# Patient Record
Sex: FEMALE | Race: ASIAN | Employment: OTHER | URBAN - METROPOLITAN AREA
[De-identification: names, ages, dates, MRNs, and addresses within clinical notes are randomized per-mention and may not be internally consistent; named-entity substitution may affect disease eponyms.]

---

## 2020-08-13 ENCOUNTER — MEDICAL CORRESPONDENCE (OUTPATIENT)
Dept: HEALTH INFORMATION MANAGEMENT | Facility: CLINIC | Age: 35
End: 2020-08-13

## 2020-08-14 ENCOUNTER — TELEPHONE (OUTPATIENT)
Dept: ADMINISTRATIVE | Facility: CLINIC | Age: 35
End: 2020-08-14

## 2020-08-14 NOTE — TELEPHONE ENCOUNTER
Diabetes Education Scheduling Outreach #1:    Call to patient to schedule. Patient wants to check with insurance on visit coverage first, and will call us back to schedule.    Plan for 2nd outreach attempt within 1 business day.    [facility]  Diabetes and Nutrition Scheduling

## 2020-08-17 NOTE — TELEPHONE ENCOUNTER
Diabetes Education Scheduling Outreach #2:    Call to patient to schedule. Left message with phone number to call to schedule.    Cammy Woods  [place] [facility]  Diabetes and Nutrition Scheduling

## 2020-08-18 NOTE — TELEPHONE ENCOUNTER
Diabetes Education Scheduling Outreach #3:    Call to patient to schedule. Left message with phone number to call to schedule.    Cammy Woods  [place] [facility]  Diabetes and Nutrition Scheduling

## 2020-08-25 ENCOUNTER — PATIENT OUTREACH (OUTPATIENT)
Dept: EDUCATION SERVICES | Facility: CLINIC | Age: 35
End: 2020-08-25
Payer: COMMERCIAL

## 2020-08-25 DIAGNOSIS — O24.419 GESTATIONAL DIABETES: Primary | ICD-10-CM

## 2020-08-25 PROCEDURE — G0108 DIAB MANAGE TRN  PER INDIV: HCPCS | Mod: 95

## 2020-08-25 RX ORDER — BLOOD-GLUCOSE METER
1 EACH MISCELLANEOUS DAILY
Qty: 1 KIT | Refills: 1 | COMMUNITY
Start: 2020-08-25

## 2020-08-25 NOTE — PROGRESS NOTES
Diabetes Self-Management Education & Support    SUBJECTIVE/OBJECTIVE:  Presents for education related to gestational diabetes.  Patient verbally consented to the telephone visit service today: yes      Accompanied by: Self  Gestational weeks:   14.5 weeks - feb 8 2021  Number of previous preganancies: 0    Cultural Influences/Ethnic Background:  [place]    Estimated Date of Delivery: Data Unavailable    1 hour OGTT  No results found for: GLU1    3 hour OGTT    Fasting  No results found for: GLF    1 hour  No results found for: GL1    2 hour  No results found for: GL2    3 hour  No results found for: GL3    Lifestyle and Health Behaviors:  Exercise:: Yes  Meal planning/habits: Avoiding sweets  Meals include: Breakfast, Lunch, Dinner  Biggest challenges to healthy eating: None    Nutrition: vegetarian   Breakfast - 1 cup of milk of cold cereal + banana or fruit if still hungry   Lunch - lentils / peas / whole wheat flat bread / rice and some vegetables    Dinner -  lentils / peas / whole wheat flat bread / rice and some vegetables    Snacks - fruit / yogurt / nuts   Milk before bed   Protein -  Lentils / beans / nuts / milk   Eggs are only occasional 1-2 months   Water, coconut water.  Milk, yogurt, cheese, sometimes tofu not often.   Avocado allergy - stomach pain     Healthy Coping:  Emotional response to diabetes: Ready to learn  Stage of change: ACTION (Actively working towards change)    Current Management:  Taking medications for gestational diabetes?: No    ASSESSMENT:  Blood sugar elevated at random check and 1 hour OGTT.  Reviewed glucometer and carbohydrate counting and topics listed below.  Patient to call [phone] if having issues with the glucometer or needing another brand.  Follow up via phone in one week.  Briefly reviewed how insulin is used as a medication if needing this and in that case [facility].      INTERVENTION:  Patient was  instructed on Contour Next One meter    Educational topics covered today:  GDM / high blood sugar diagnosis, pathophysiology, Risks and Complications of GDM, Means of controlling GDM, Using a Blood Glucose Monitor, Blood Glucose Goals, Logging and Interpreting Glucose Results,   When to Call a Diabetes Educator or OB Provider, Healthy Eating During Pregnancy, Counting Carbohydrates, Meal Planning for GDM, and Physical Activity    Educational materials provided today:   [facility] Understanding Gestational Diabetes  GDM Log Book  Care After Delivery    Pt verbalized understanding of concepts discussed and recommendations provided today.     PLAN:  Ketone Testing at follow up visit   Check glucose 4 times daily, before breakfast and 1 hour after each meal.   Physical activity recommended: as tolerated .  Meal plan: 2-3 carbs at breakfast, 3-4 carbs at lunch, 3-4 carbs at supper, 1-2 carbs at 3 snacks a day.  Follow consistent CHO meal plan, eat CHO and protein/fat at all meals/snacks.    Call/e-mail/Next Safetyhart message diabetes educator if 3 or more blood sugars are above the goal in 1 week, if ketones are positive, or with questions/concerns.    Giselle Lyn RD, LD, CDE  Diabetes Education    Time Spent: 56 minutes  Encounter Type: Individual    A copy of this encounter was shared with the provider via fax.

## 2020-08-25 NOTE — LETTER
8/25/2020         RE: Adri Jaeger  [id number] [facility] [address]        Dear Colleague,  (FYI only)     Thank you for referring your patient, Adri Jaeger, to the [facility]. Please see a copy of my visit note below.    She will be picking up the meter this week and will begin QID blood sugar checks.  Will begin carbohydrate counting and logging foods.   Follow up in one week and we can update on blood sugars at that time.     Thanks!  Jeannine Lyn RD, LD, CDE  Diabetes Education      Diabetes Self-Management Education & Support    SUBJECTIVE/OBJECTIVE:  Presents for education related to gestational diabetes.  Patient verbally consented to the telephone visit service today: yes      Accompanied by: Self  Gestational weeks:   14.5 weeks - feb 8 2021  Number of previous preganancies: 0    Cultural Influences/Ethnic Background:      Estimated Date of Delivery: Data Unavailable    1 hour OGTT  No results found for: GLU1    3 hour OGTT    Fasting  No results found for: GLF    1 hour  No results found for: GL1    2 hour  No results found for: GL2    3 hour  No results found for: GL3    Lifestyle and Health Behaviors:  Exercise:: Yes  Meal planning/habits: Avoiding sweets  Meals include: Breakfast, Lunch, Dinner  Biggest challenges to healthy eating: None    Nutrition: vegetarian   Breakfast - 1 cup of milk of cold cereal + banana or fruit if still hungry   Lunch - lentils / peas / whole wheat flat bread / rice and some vegetables    Dinner -  lentils / peas / whole wheat flat bread / rice and some vegetables    Snacks - fruit / yogurt / nuts   Milk before bed   Protein -  Lentils / beans / nuts / milk   Eggs are only occasional 1-2 months   Water, coconut water.  Milk, yogurt, cheese, sometimes tofu not often.   Avocado allergy - stomach pain     Healthy Coping:  Emotional response to diabetes: Ready to learn  Stage of change: ACTION (Actively working  towards change)    Current Management:  Taking medications for gestational diabetes?: No    ASSESSMENT:  Blood sugar elevated at random check and 1 hour OGTT.  Reviewed glucometer and carbohydrate counting and topics listed below.  Patient to call [phone] if having issues with the glucometer or needing another brand.  Follow up via phone in one week.  Briefly reviewed how insulin is used as a medication if needing this and in that case patient would be referred to [facility] endocrinology for management.      INTERVENTION:  Patient was instructed on Contour Next One meter    Educational topics covered today:  GDM / high blood sugar diagnosis, pathophysiology, Risks and Complications of GDM, Means of controlling GDM, Using a Blood Glucose Monitor, Blood Glucose Goals, Logging and Interpreting Glucose Results,   When to Call a Diabetes Educator or OB Provider, Healthy Eating During Pregnancy, Counting Carbohydrates, Meal Planning for GDM, and Physical Activity    Educational materials provided today:   [facility] [place] Gestational Diabetes  GDM Log Book  Care After Delivery    Pt verbalized understanding of concepts discussed and recommendations provided today.     PLAN:  Ketone Testing at follow up visit   Check glucose 4 times daily, before breakfast and 1 hour after each meal.   Physical activity recommended: as tolerated .  Meal plan: 2-3 carbs at breakfast, 3-4 carbs at lunch, 3-4 carbs at supper, 1-2 carbs at 3 snacks a day.  Follow consistent CHO meal plan, eat CHO and protein/fat at all meals/snacks.    Call/e-mail/Vyyohart message diabetes educator if 3 or more blood sugars are above the goal in 1 week, if ketones are positive, or with questions/concerns.    Giselle Lyn RD, LD, CDE  Diabetes Education    Time Spent: 56 minutes  Encounter Type: Individual    A copy of this encounter was shared with the provider.

## 2020-09-01 ENCOUNTER — VIRTUAL VISIT (OUTPATIENT)
Dept: EDUCATION SERVICES | Facility: CLINIC | Age: 35
End: 2020-09-01
Payer: COMMERCIAL

## 2020-09-01 DIAGNOSIS — O24.419 GESTATIONAL DIABETES: Primary | ICD-10-CM

## 2020-09-01 PROCEDURE — 98968 PH1 ASSMT&MGMT NQHP 21-30: CPT | Mod: 95

## 2020-09-01 NOTE — LETTER
URGENT    9/1/2020         RE: Adri Jaeger  [address] [facility] [address]        Dr. Viera,  100% of patient's fasting, post-lunch and post-dinner readings are above target.  Would recommend insulin start. As indicated in referral, if patient needs insulin she will be referred to Endocrinology.  Will plan to hand over GDM care to endo, provided patient with our phone number if she has any concerns prior to her Endo appointment.   Thanks,  Nena Ch, RD, LD, CDE      Diabetes Self-Management Education & Support    SUBJECTIVE/OBJECTIVE:  Telephone appointment for education related to gestational diabetes.    Patient verbally consented to the telephone visit service today: yes    Cultural Influences/Ethnic Background:  [place]    There were no vitals taken for this visit.    Weight gain Unknown    Estimated Date of Delivery: 2/8/2021  Blood Glucose/Ketone Log:   Blood Glucose/Ketone Log:    Date Ketones Fasting Post Breakfast Post Lunch Post Supper   9/1  97 100     8/31  123 125 146 147   8/30  101 105 182 145   8/29  114 115 177 169   8/28  108 138 149 148   8/27  124 91 203 202     Breakfast: almonds and walnuts and 1 glass of milk  Walk 15 minutes  Snack: If she feels hungry  Lunch: Rice with vegetables and lentils OR whole wheat flat bread and lentils and vegetables  Walk 15 minutes  Snack: If she feels hungry  Dinner: whole wheat flat bread with vegetables and lentils  Walk 15 minutes  Pm snack: milk      Lifestyle and Health Behaviors:       Healthy Coping:       Current Management:       ASSESSMENT:  Ketones: not testing.   Fasting blood glucoses: 0% in target.  After breakfast: 100% in target.  After lunch: 0% in target.  After dinner: 0% in target.    Only Adri's after breakfast readings are in target, all other numbers elevated. She has been experimenting with different foods and different amounts of foods at her meal to help keep her blood sugars down. She states that the  carbohydrate counting is going well, no questions or concerns.  She has found that when she doesn't have a bedtime snack (last night) her fasting BG was closer to normal.    Writer discussed recommendation to start insulin, patient is very hesitant and is wondering if she can work on her diet for one more week.  Writer explained that [facility] will refer her to Endocrinology which can take a couple of days, recommended that she schedule the Endo appt and work on diet changes until the appointment. Explained that the insulin needs increase as the pregnancy progresses and it will get harder and harder to bring BG down with diet alone. Also explained that we would like to get the blood sugars down quickly to help protect the baby. Patient is reluctant, but agreeable to plan.     INTERVENTION:  Educational topics covered today:  Discussed meal plan, insulin start recommendations, discussed exercise.    Educational Materials provided today:  No new materials    PLAN:  Recommend insulin start- sending to [facility] for endocrinology referral. Endo will follow for remainder of pregnancy.  Encouraged patient to call with any questions/concerns until endocrinology appointment.     Check glucose 4 times daily.  Check ketones once a week when readings are consistently negative.  Continue with recommended physical activity.  Continue to follow recommended meal plan: 2-3 carbs at breakfast, 3-4 carbs at lunch, 3-4 carbs at supper, 1-2 carbs at snacks.  Follow consistent CHO meal plan, eat CHO and protein/fat at all meals/snacks.    Call/e-mail/MyChart message diabetes educator if 3 or more blood sugars are above the goal in 1 week or if ketones are positive.    Nena Ch, APRYL, LD, CDE  Time Spent: 26 minutes  Encounter Type: Individual    Any diabetes medication dose changes were made via the CDE Protocol and Collaborative Practice Agreement with the patient's referring provider. A copy of this encounter was  shared with the provider.

## 2020-09-11 ENCOUNTER — TELEPHONE (OUTPATIENT)
Dept: EDUCATION SERVICES | Facility: CLINIC | Age: 35
End: 2020-09-11

## 2020-09-11 NOTE — TELEPHONE ENCOUNTER
Called to call back [phone]    Beckie Alvarado RN/ALMA [facility] Diabetes Educator    Called back:    Gestational Diabetes Follow-up    Subjective/Objective:    Adri Jaeger sent in blood glucose log for review. Last date of communication was: 9/1/2020.    Gestational diabetes is being managed with diet and activity    Taking diabetes medications: no    Estimated Date of Delivery: Data Unavailable    BG/Food Log:   Blood Glucose/Ketone Log:    Date Ketones Fasting Post Breakfast Post Lunch Post Supper   9/1  97 100 162 161     9/2  96 111 157 150   9/3  98 181 139 181   9/4  105 111 183 160   9/5  93  101 201   9/6  92 123 159 102   9/7  9/8  9/9  9/10  9/11  94  90  92  98  105 102  140  130  96  160 106  151  208  87 161  171  168  154         Assessment:80    Ketones: not checking.   Fasting blood glucoses: 54% in target.  After breakfast: 80% in target.  After lunch: 44% in target.  After dinner: 11% in target.    Plan/Response:    RECOMMEND INSULIN START BEDTIME AND PRE MEALS.  PER [facility] for Endo to follow per their protocol on their referrals. Dated 8/14/2020    Recommend referral to Endocrinology. Per the [facility] protocol.    Called [facility]  And spoke to triage, I believe Catie, and we did discuss the issue at hand.  She is sending a message to the on call OBGYN to get this patient started on an Endo referral and begin insulin.  I did let patient know that she will be following Endo from this point on since she will need insulin therapy.    Copy this note and fax to [phone]    Beckie Alvarado RN/ALMA [facility] Diabetes Educator      Any diabetes medication dose changes were made via the CDE Protocol and Collaborative Practice Agreement with the patient's primary care provider, endocrinology provider and OB/GYN provider. A copy of this encounter was shared with the provider.

## 2021-01-29 ENCOUNTER — ANESTHESIA EVENT (OUTPATIENT)
Dept: SURGERY | Facility: CLINIC | Age: 36
End: 2021-01-29
Payer: COMMERCIAL

## 2021-01-29 ENCOUNTER — ANESTHESIA (OUTPATIENT)
Dept: SURGERY | Facility: CLINIC | Age: 36
End: 2021-01-29
Payer: COMMERCIAL

## 2021-01-29 ENCOUNTER — HOSPITAL ENCOUNTER (INPATIENT)
Facility: CLINIC | Age: 36
LOS: 3 days | Discharge: HOME | End: 2021-02-01
Attending: OBSTETRICS & GYNECOLOGY | Admitting: OBSTETRICS & GYNECOLOGY
Payer: COMMERCIAL

## 2021-01-29 DIAGNOSIS — Z98.891 S/P CESAREAN SECTION: Primary | ICD-10-CM

## 2021-01-29 PROBLEM — O47.9 UTERINE CONTRACTIONS DURING PREGNANCY: Status: ACTIVE | Noted: 2021-01-29

## 2021-01-29 LAB
ABO + RH BLD: NORMAL
ABO + RH BLD: NORMAL
BASOPHILS # BLD AUTO: 0 10E9/L (ref 0–0.2)
BASOPHILS NFR BLD AUTO: 0.1 %
DIFFERENTIAL METHOD BLD: ABNORMAL
EOSINOPHIL # BLD AUTO: 0 10E9/L (ref 0–0.7)
EOSINOPHIL NFR BLD AUTO: 0.1 %
ERYTHROCYTE [DISTWIDTH] IN BLOOD BY AUTOMATED COUNT: 13.6 % (ref 10–15)
GLUCOSE BLDC GLUCOMTR-MCNC: 106 MG/DL (ref 70–99)
GLUCOSE BLDC GLUCOMTR-MCNC: 121 MG/DL (ref 70–99)
GLUCOSE BLDC GLUCOMTR-MCNC: 138 MG/DL (ref 70–99)
GLUCOSE BLDC GLUCOMTR-MCNC: 96 MG/DL (ref 70–99)
HCT VFR BLD AUTO: 36.2 % (ref 35–47)
HGB BLD-MCNC: 12 G/DL (ref 11.7–15.7)
IMM GRANULOCYTES # BLD: 0.1 10E9/L (ref 0–0.4)
IMM GRANULOCYTES NFR BLD: 0.6 %
LABORATORY COMMENT REPORT: NORMAL
LYMPHOCYTES # BLD AUTO: 1.1 10E9/L (ref 0.8–5.3)
LYMPHOCYTES NFR BLD AUTO: 7.8 %
MCH RBC QN AUTO: 28.6 PG (ref 26.5–33)
MCHC RBC AUTO-ENTMCNC: 33.1 G/DL (ref 31.5–36.5)
MCV RBC AUTO: 86 FL (ref 78–100)
MONOCYTES # BLD AUTO: 0.3 10E9/L (ref 0–1.3)
MONOCYTES NFR BLD AUTO: 2 %
NEUTROPHILS # BLD AUTO: 12.8 10E9/L (ref 1.6–8.3)
NEUTROPHILS NFR BLD AUTO: 89.4 %
NRBC # BLD AUTO: 0 10*3/UL
NRBC BLD AUTO-RTO: 0 /100
PLATELET # BLD AUTO: 203 10E9/L (ref 150–450)
RBC # BLD AUTO: 4.19 10E12/L (ref 3.8–5.2)
SARS-COV-2 RNA RESP QL NAA+PROBE: NEGATIVE
SPECIMEN EXP DATE BLD: NORMAL
SPECIMEN SOURCE: NORMAL
WBC # BLD AUTO: 14.3 10E9/L (ref 4–11)

## 2021-01-29 PROCEDURE — 272N000001 HC OR GENERAL SUPPLY STERILE: Performed by: OBSTETRICS & GYNECOLOGY

## 2021-01-29 PROCEDURE — 258N000003 HC RX IP 258 OP 636: Performed by: OBSTETRICS & GYNECOLOGY

## 2021-01-29 PROCEDURE — 250N000011 HC RX IP 250 OP 636

## 2021-01-29 PROCEDURE — 370N000017 HC ANESTHESIA TECHNICAL FEE, PER MIN: Performed by: OBSTETRICS & GYNECOLOGY

## 2021-01-29 PROCEDURE — 86901 BLOOD TYPING SEROLOGIC RH(D): CPT | Performed by: OBSTETRICS & GYNECOLOGY

## 2021-01-29 PROCEDURE — 710N000009 HC RECOVERY PHASE 1, LEVEL 1, PER MIN: Performed by: OBSTETRICS & GYNECOLOGY

## 2021-01-29 PROCEDURE — 360N000076 HC SURGERY LEVEL 3, PER MIN: Performed by: OBSTETRICS & GYNECOLOGY

## 2021-01-29 PROCEDURE — 258N000003 HC RX IP 258 OP 636

## 2021-01-29 PROCEDURE — 250N000009 HC RX 250: Performed by: ANESTHESIOLOGY

## 2021-01-29 PROCEDURE — 250N000013 HC RX MED GY IP 250 OP 250 PS 637: Performed by: OBSTETRICS & GYNECOLOGY

## 2021-01-29 PROCEDURE — 3E0R3BZ INTRODUCTION OF ANESTHETIC AGENT INTO SPINAL CANAL, PERCUTANEOUS APPROACH: ICD-10-PCS | Performed by: ANESTHESIOLOGY

## 2021-01-29 PROCEDURE — 120N000001 HC R&B MED SURG/OB

## 2021-01-29 PROCEDURE — G0463 HOSPITAL OUTPT CLINIC VISIT: HCPCS | Mod: 25

## 2021-01-29 PROCEDURE — 250N000011 HC RX IP 250 OP 636: Performed by: OBSTETRICS & GYNECOLOGY

## 2021-01-29 PROCEDURE — 85025 COMPLETE CBC W/AUTO DIFF WBC: CPT | Performed by: OBSTETRICS & GYNECOLOGY

## 2021-01-29 PROCEDURE — 86900 BLOOD TYPING SEROLOGIC ABO: CPT | Performed by: OBSTETRICS & GYNECOLOGY

## 2021-01-29 PROCEDURE — 250N000011 HC RX IP 250 OP 636: Performed by: NURSE ANESTHETIST, CERTIFIED REGISTERED

## 2021-01-29 PROCEDURE — 87635 SARS-COV-2 COVID-19 AMP PRB: CPT | Performed by: OBSTETRICS & GYNECOLOGY

## 2021-01-29 PROCEDURE — 86780 TREPONEMA PALLIDUM: CPT | Performed by: OBSTETRICS & GYNECOLOGY

## 2021-01-29 PROCEDURE — 999N001017 HC STATISTIC GLUCOSE BY METER IP

## 2021-01-29 PROCEDURE — 00HU33Z INSERTION OF INFUSION DEVICE INTO SPINAL CANAL, PERCUTANEOUS APPROACH: ICD-10-PCS | Performed by: ANESTHESIOLOGY

## 2021-01-29 PROCEDURE — 250N000009 HC RX 250: Performed by: NURSE ANESTHETIST, CERTIFIED REGISTERED

## 2021-01-29 PROCEDURE — 250N000009 HC RX 250

## 2021-01-29 PROCEDURE — 258N000003 HC RX IP 258 OP 636: Performed by: NURSE ANESTHETIST, CERTIFIED REGISTERED

## 2021-01-29 RX ORDER — OXYTOCIN/0.9 % SODIUM CHLORIDE 30/500 ML
100 PLASTIC BAG, INJECTION (ML) INTRAVENOUS CONTINUOUS
Status: DISCONTINUED | OUTPATIENT
Start: 2021-01-29 | End: 2021-02-01 | Stop reason: HOSPADM

## 2021-01-29 RX ORDER — TRANEXAMIC ACID 10 MG/ML
1 INJECTION, SOLUTION INTRAVENOUS EVERY 30 MIN PRN
Status: DISCONTINUED | OUTPATIENT
Start: 2021-01-29 | End: 2021-01-29

## 2021-01-29 RX ORDER — AMOXICILLIN 250 MG
2 CAPSULE ORAL 2 TIMES DAILY
Status: DISCONTINUED | OUTPATIENT
Start: 2021-01-29 | End: 2021-02-01 | Stop reason: HOSPADM

## 2021-01-29 RX ORDER — CARBOPROST TROMETHAMINE 250 UG/ML
250 INJECTION, SOLUTION INTRAMUSCULAR
Status: DISCONTINUED | OUTPATIENT
Start: 2021-01-29 | End: 2021-01-29

## 2021-01-29 RX ORDER — OXYTOCIN/0.9 % SODIUM CHLORIDE 30/500 ML
PLASTIC BAG, INJECTION (ML) INTRAVENOUS
Status: DISCONTINUED
Start: 2021-01-29 | End: 2021-01-29 | Stop reason: HOSPADM

## 2021-01-29 RX ORDER — SODIUM CHLORIDE, SODIUM LACTATE, POTASSIUM CHLORIDE, CALCIUM CHLORIDE 600; 310; 30; 20 MG/100ML; MG/100ML; MG/100ML; MG/100ML
INJECTION, SOLUTION INTRAVENOUS CONTINUOUS PRN
Status: DISCONTINUED | OUTPATIENT
Start: 2021-01-29 | End: 2021-01-29

## 2021-01-29 RX ORDER — OXYTOCIN/0.9 % SODIUM CHLORIDE 30/500 ML
PLASTIC BAG, INJECTION (ML) INTRAVENOUS PRN
Status: DISCONTINUED | OUTPATIENT
Start: 2021-01-29 | End: 2021-01-29

## 2021-01-29 RX ORDER — NICOTINE POLACRILEX 4 MG
15-30 LOZENGE BUCCAL
Status: DISCONTINUED | OUTPATIENT
Start: 2021-01-29 | End: 2021-01-29

## 2021-01-29 RX ORDER — ONDANSETRON 2 MG/ML
INJECTION INTRAMUSCULAR; INTRAVENOUS PRN
Status: DISCONTINUED | OUTPATIENT
Start: 2021-01-29 | End: 2021-01-29

## 2021-01-29 RX ORDER — ONDANSETRON 2 MG/ML
4 INJECTION INTRAMUSCULAR; INTRAVENOUS EVERY 6 HOURS PRN
Status: DISCONTINUED | OUTPATIENT
Start: 2021-01-29 | End: 2021-02-01 | Stop reason: HOSPADM

## 2021-01-29 RX ORDER — FENTANYL/BUPIVACAINE/NS/PF 2-1250MCG
PLASTIC BAG, INJECTION (ML) INJECTION
Status: COMPLETED
Start: 2021-01-29 | End: 2021-01-29

## 2021-01-29 RX ORDER — ACETAMINOPHEN 325 MG/1
650 TABLET ORAL EVERY 4 HOURS PRN
Status: DISCONTINUED | OUTPATIENT
Start: 2021-01-29 | End: 2021-01-29

## 2021-01-29 RX ORDER — CEFAZOLIN SODIUM 1 G/3ML
1 INJECTION, POWDER, FOR SOLUTION INTRAMUSCULAR; INTRAVENOUS SEE ADMIN INSTRUCTIONS
Status: DISCONTINUED | OUTPATIENT
Start: 2021-01-29 | End: 2021-01-29 | Stop reason: HOSPADM

## 2021-01-29 RX ORDER — IBUPROFEN 800 MG/1
800 TABLET, FILM COATED ORAL EVERY 6 HOURS
Status: DISCONTINUED | OUTPATIENT
Start: 2021-01-30 | End: 2021-02-01 | Stop reason: HOSPADM

## 2021-01-29 RX ORDER — OXYTOCIN 10 [USP'U]/ML
10 INJECTION, SOLUTION INTRAMUSCULAR; INTRAVENOUS
Status: DISCONTINUED | OUTPATIENT
Start: 2021-01-29 | End: 2021-02-01 | Stop reason: HOSPADM

## 2021-01-29 RX ORDER — EPHEDRINE SULFATE 50 MG/ML
INJECTION, SOLUTION INTRAVENOUS PRN
Status: DISCONTINUED | OUTPATIENT
Start: 2021-01-29 | End: 2021-01-29

## 2021-01-29 RX ORDER — OXYCODONE AND ACETAMINOPHEN 5; 325 MG/1; MG/1
1 TABLET ORAL
Status: DISCONTINUED | OUTPATIENT
Start: 2021-01-29 | End: 2021-01-29

## 2021-01-29 RX ORDER — MODIFIED LANOLIN
OINTMENT (GRAM) TOPICAL
Status: DISCONTINUED | OUTPATIENT
Start: 2021-01-29 | End: 2021-02-01 | Stop reason: HOSPADM

## 2021-01-29 RX ORDER — NALOXONE HYDROCHLORIDE 0.4 MG/ML
0.4 INJECTION, SOLUTION INTRAMUSCULAR; INTRAVENOUS; SUBCUTANEOUS
Status: DISCONTINUED | OUTPATIENT
Start: 2021-01-29 | End: 2021-02-01 | Stop reason: HOSPADM

## 2021-01-29 RX ORDER — OXYTOCIN/0.9 % SODIUM CHLORIDE 30/500 ML
100-340 PLASTIC BAG, INJECTION (ML) INTRAVENOUS CONTINUOUS PRN
Status: DISCONTINUED | OUTPATIENT
Start: 2021-01-29 | End: 2021-01-29

## 2021-01-29 RX ORDER — KETOROLAC TROMETHAMINE 30 MG/ML
30 INJECTION, SOLUTION INTRAMUSCULAR; INTRAVENOUS EVERY 6 HOURS
Status: COMPLETED | OUTPATIENT
Start: 2021-01-29 | End: 2021-01-30

## 2021-01-29 RX ORDER — HYDROCORTISONE 2.5 %
CREAM (GRAM) TOPICAL 3 TIMES DAILY PRN
Status: DISCONTINUED | OUTPATIENT
Start: 2021-01-29 | End: 2021-02-01 | Stop reason: HOSPADM

## 2021-01-29 RX ORDER — NALOXONE HYDROCHLORIDE 0.4 MG/ML
0.2 INJECTION, SOLUTION INTRAMUSCULAR; INTRAVENOUS; SUBCUTANEOUS
Status: DISCONTINUED | OUTPATIENT
Start: 2021-01-29 | End: 2021-01-29

## 2021-01-29 RX ORDER — BISACODYL 10 MG
10 SUPPOSITORY, RECTAL RECTAL DAILY PRN
Status: DISCONTINUED | OUTPATIENT
Start: 2021-01-31 | End: 2021-02-01 | Stop reason: HOSPADM

## 2021-01-29 RX ORDER — NALOXONE HYDROCHLORIDE 0.4 MG/ML
0.2 INJECTION, SOLUTION INTRAMUSCULAR; INTRAVENOUS; SUBCUTANEOUS
Status: DISCONTINUED | OUTPATIENT
Start: 2021-01-29 | End: 2021-02-01 | Stop reason: HOSPADM

## 2021-01-29 RX ORDER — OXYTOCIN/0.9 % SODIUM CHLORIDE 30/500 ML
340 PLASTIC BAG, INJECTION (ML) INTRAVENOUS CONTINUOUS PRN
Status: DISCONTINUED | OUTPATIENT
Start: 2021-01-29 | End: 2021-02-01 | Stop reason: HOSPADM

## 2021-01-29 RX ORDER — SIMETHICONE 80 MG
80 TABLET,CHEWABLE ORAL 4 TIMES DAILY PRN
Status: DISCONTINUED | OUTPATIENT
Start: 2021-01-29 | End: 2021-02-01 | Stop reason: HOSPADM

## 2021-01-29 RX ORDER — CEFAZOLIN SODIUM 2 G/100ML
2 INJECTION, SOLUTION INTRAVENOUS
Status: DISCONTINUED | OUTPATIENT
Start: 2021-01-29 | End: 2021-01-29 | Stop reason: HOSPADM

## 2021-01-29 RX ORDER — SODIUM CHLORIDE, SODIUM LACTATE, POTASSIUM CHLORIDE, CALCIUM CHLORIDE 600; 310; 30; 20 MG/100ML; MG/100ML; MG/100ML; MG/100ML
INJECTION, SOLUTION INTRAVENOUS CONTINUOUS
Status: DISCONTINUED | OUTPATIENT
Start: 2021-01-29 | End: 2021-01-29 | Stop reason: HOSPADM

## 2021-01-29 RX ORDER — ACETAMINOPHEN 325 MG/1
975 TABLET ORAL EVERY 6 HOURS
Status: DISCONTINUED | OUTPATIENT
Start: 2021-01-29 | End: 2021-02-01 | Stop reason: HOSPADM

## 2021-01-29 RX ORDER — DEXTROSE MONOHYDRATE 25 G/50ML
25-50 INJECTION, SOLUTION INTRAVENOUS
Status: DISCONTINUED | OUTPATIENT
Start: 2021-01-29 | End: 2021-02-01 | Stop reason: HOSPADM

## 2021-01-29 RX ORDER — NALOXONE HYDROCHLORIDE 0.4 MG/ML
0.4 INJECTION, SOLUTION INTRAMUSCULAR; INTRAVENOUS; SUBCUTANEOUS
Status: DISCONTINUED | OUTPATIENT
Start: 2021-01-29 | End: 2021-01-29

## 2021-01-29 RX ORDER — DEXTROSE, SODIUM CHLORIDE, SODIUM LACTATE, POTASSIUM CHLORIDE, AND CALCIUM CHLORIDE 5; .6; .31; .03; .02 G/100ML; G/100ML; G/100ML; G/100ML; G/100ML
INJECTION, SOLUTION INTRAVENOUS CONTINUOUS
Status: DISCONTINUED | OUTPATIENT
Start: 2021-01-29 | End: 2021-02-01 | Stop reason: HOSPADM

## 2021-01-29 RX ORDER — OXYCODONE HYDROCHLORIDE 5 MG/1
5 TABLET ORAL EVERY 4 HOURS PRN
Status: DISCONTINUED | OUTPATIENT
Start: 2021-01-29 | End: 2021-02-01 | Stop reason: HOSPADM

## 2021-01-29 RX ORDER — AMOXICILLIN 250 MG
1 CAPSULE ORAL 2 TIMES DAILY
Status: DISCONTINUED | OUTPATIENT
Start: 2021-01-29 | End: 2021-02-01 | Stop reason: HOSPADM

## 2021-01-29 RX ORDER — OXYTOCIN 10 [USP'U]/ML
10 INJECTION, SOLUTION INTRAMUSCULAR; INTRAVENOUS
Status: DISCONTINUED | OUTPATIENT
Start: 2021-01-29 | End: 2021-01-29

## 2021-01-29 RX ORDER — DEXTROSE MONOHYDRATE 25 G/50ML
25-50 INJECTION, SOLUTION INTRAVENOUS
Status: DISCONTINUED | OUTPATIENT
Start: 2021-01-29 | End: 2021-01-29

## 2021-01-29 RX ORDER — TRANEXAMIC ACID 10 MG/ML
1 INJECTION, SOLUTION INTRAVENOUS EVERY 30 MIN PRN
Status: DISCONTINUED | OUTPATIENT
Start: 2021-01-29 | End: 2021-02-01 | Stop reason: HOSPADM

## 2021-01-29 RX ORDER — AZITHROMYCIN 500 MG/5ML
500 INJECTION, POWDER, LYOPHILIZED, FOR SOLUTION INTRAVENOUS
Status: DISCONTINUED | OUTPATIENT
Start: 2021-01-29 | End: 2021-01-29 | Stop reason: HOSPADM

## 2021-01-29 RX ORDER — CITRIC ACID/SODIUM CITRATE 334-500MG
30 SOLUTION, ORAL ORAL
Status: COMPLETED | OUTPATIENT
Start: 2021-01-29 | End: 2021-01-29

## 2021-01-29 RX ORDER — MORPHINE SULFATE 1 MG/ML
INJECTION, SOLUTION EPIDURAL; INTRATHECAL; INTRAVENOUS PRN
Status: DISCONTINUED | OUTPATIENT
Start: 2021-01-29 | End: 2021-01-29

## 2021-01-29 RX ORDER — SODIUM CHLORIDE 9 MG/ML
INJECTION, SOLUTION INTRAVENOUS CONTINUOUS
Status: DISCONTINUED | OUTPATIENT
Start: 2021-01-29 | End: 2021-01-29

## 2021-01-29 RX ORDER — ONDANSETRON 2 MG/ML
4 INJECTION INTRAMUSCULAR; INTRAVENOUS EVERY 6 HOURS PRN
Status: DISCONTINUED | OUTPATIENT
Start: 2021-01-29 | End: 2021-01-29

## 2021-01-29 RX ORDER — METHYLERGONOVINE MALEATE 0.2 MG/ML
200 INJECTION INTRAVENOUS
Status: DISCONTINUED | OUTPATIENT
Start: 2021-01-29 | End: 2021-01-29

## 2021-01-29 RX ORDER — EPHEDRINE SULFATE 50 MG/ML
INJECTION, SOLUTION INTRAMUSCULAR; INTRAVENOUS; SUBCUTANEOUS
Status: COMPLETED
Start: 2021-01-29 | End: 2021-01-29

## 2021-01-29 RX ORDER — FENTANYL CITRATE 50 UG/ML
50-100 INJECTION, SOLUTION INTRAMUSCULAR; INTRAVENOUS
Status: DISCONTINUED | OUTPATIENT
Start: 2021-01-29 | End: 2021-01-29

## 2021-01-29 RX ORDER — HYDROMORPHONE HYDROCHLORIDE 1 MG/ML
0.2 INJECTION, SOLUTION INTRAMUSCULAR; INTRAVENOUS; SUBCUTANEOUS
Status: DISCONTINUED | OUTPATIENT
Start: 2021-01-29 | End: 2021-02-01 | Stop reason: HOSPADM

## 2021-01-29 RX ORDER — PHENYLEPHRINE HYDROCHLORIDE 10 MG/ML
INJECTION INTRAVENOUS PRN
Status: DISCONTINUED | OUTPATIENT
Start: 2021-01-29 | End: 2021-01-29

## 2021-01-29 RX ORDER — LIDOCAINE 40 MG/G
CREAM TOPICAL
Status: DISCONTINUED | OUTPATIENT
Start: 2021-01-29 | End: 2021-01-29

## 2021-01-29 RX ORDER — LIDOCAINE HYDROCHLORIDE 10 MG/ML
INJECTION, SOLUTION EPIDURAL; INFILTRATION; INTRACAUDAL; PERINEURAL
Status: DISCONTINUED
Start: 2021-01-29 | End: 2021-01-29 | Stop reason: HOSPADM

## 2021-01-29 RX ORDER — IBUPROFEN 800 MG/1
800 TABLET, FILM COATED ORAL
Status: DISCONTINUED | OUTPATIENT
Start: 2021-01-29 | End: 2021-01-29

## 2021-01-29 RX ORDER — NICOTINE POLACRILEX 4 MG
15-30 LOZENGE BUCCAL
Status: DISCONTINUED | OUTPATIENT
Start: 2021-01-29 | End: 2021-02-01 | Stop reason: HOSPADM

## 2021-01-29 RX ORDER — LIDOCAINE 40 MG/G
CREAM TOPICAL
Status: DISCONTINUED | OUTPATIENT
Start: 2021-01-29 | End: 2021-02-01 | Stop reason: HOSPADM

## 2021-01-29 RX ORDER — SODIUM CHLORIDE, SODIUM LACTATE, POTASSIUM CHLORIDE, CALCIUM CHLORIDE 600; 310; 30; 20 MG/100ML; MG/100ML; MG/100ML; MG/100ML
INJECTION, SOLUTION INTRAVENOUS CONTINUOUS
Status: DISCONTINUED | OUTPATIENT
Start: 2021-01-29 | End: 2021-01-29

## 2021-01-29 RX ORDER — LIDOCAINE HCL/EPINEPHRINE/PF 2%-1:200K
VIAL (ML) INJECTION PRN
Status: DISCONTINUED | OUTPATIENT
Start: 2021-01-29 | End: 2021-01-29

## 2021-01-29 RX ADMIN — EPHEDRINE SULFATE 5 MG: 50 INJECTION INTRAVENOUS at 13:10

## 2021-01-29 RX ADMIN — ONDANSETRON HYDROCHLORIDE 4 MG: 2 INJECTION, SOLUTION INTRAVENOUS at 14:14

## 2021-01-29 RX ADMIN — SODIUM CHLORIDE, POTASSIUM CHLORIDE, SODIUM LACTATE AND CALCIUM CHLORIDE: 600; 310; 30; 20 INJECTION, SOLUTION INTRAVENOUS at 13:45

## 2021-01-29 RX ADMIN — PHENYLEPHRINE HYDROCHLORIDE 100 MCG: 10 INJECTION INTRAVENOUS at 14:48

## 2021-01-29 RX ADMIN — SODIUM CHLORIDE, POTASSIUM CHLORIDE, SODIUM LACTATE AND CALCIUM CHLORIDE: 600; 310; 30; 20 INJECTION, SOLUTION INTRAVENOUS at 15:02

## 2021-01-29 RX ADMIN — PHENYLEPHRINE HYDROCHLORIDE 100 MCG: 10 INJECTION INTRAVENOUS at 14:55

## 2021-01-29 RX ADMIN — DOCUSATE SODIUM 50 MG AND SENNOSIDES 8.6 MG 1 TABLET: 8.6; 5 TABLET, FILM COATED ORAL at 22:01

## 2021-01-29 RX ADMIN — LIDOCAINE HYDROCHLORIDE,EPINEPHRINE BITARTRATE 10 ML: 20; .005 INJECTION, SOLUTION EPIDURAL; INFILTRATION; INTRACAUDAL; PERINEURAL at 14:07

## 2021-01-29 RX ADMIN — LIDOCAINE HYDROCHLORIDE,EPINEPHRINE BITARTRATE 5 ML: 20; .005 INJECTION, SOLUTION EPIDURAL; INFILTRATION; INTRACAUDAL; PERINEURAL at 14:12

## 2021-01-29 RX ADMIN — EPHEDRINE SULFATE 5 MG: 50 INJECTION, SOLUTION INTRAVENOUS at 14:14

## 2021-01-29 RX ADMIN — PHENYLEPHRINE HYDROCHLORIDE 50 MCG: 10 INJECTION INTRAVENOUS at 14:14

## 2021-01-29 RX ADMIN — MORPHINE SULFATE 3 MG: 1 INJECTION EPIDURAL; INTRATHECAL; INTRAVENOUS at 14:45

## 2021-01-29 RX ADMIN — PHENYLEPHRINE HYDROCHLORIDE 100 MCG: 10 INJECTION INTRAVENOUS at 14:11

## 2021-01-29 RX ADMIN — KETOROLAC TROMETHAMINE 30 MG: 30 INJECTION, SOLUTION INTRAMUSCULAR at 19:04

## 2021-01-29 RX ADMIN — FENTANYL CITRATE: 50 INJECTION, SOLUTION INTRAMUSCULAR; INTRAVENOUS at 12:52

## 2021-01-29 RX ADMIN — SODIUM CHLORIDE, POTASSIUM CHLORIDE, SODIUM LACTATE AND CALCIUM CHLORIDE 1000 ML: 600; 310; 30; 20 INJECTION, SOLUTION INTRAVENOUS at 22:18

## 2021-01-29 RX ADMIN — ACETAMINOPHEN 975 MG: 325 TABLET, FILM COATED ORAL at 19:03

## 2021-01-29 RX ADMIN — SODIUM CHLORIDE, POTASSIUM CHLORIDE, SODIUM LACTATE AND CALCIUM CHLORIDE 500 ML: 600; 310; 30; 20 INJECTION, SOLUTION INTRAVENOUS at 12:16

## 2021-01-29 RX ADMIN — SODIUM CITRATE AND CITRIC ACID MONOHYDRATE 30 ML: 500; 334 SOLUTION ORAL at 14:03

## 2021-01-29 RX ADMIN — OXYTOCIN-SODIUM CHLORIDE 0.9% IV SOLN 30 UNIT/500ML 1 ML: 30-0.9/5 SOLUTION at 14:37

## 2021-01-29 NOTE — ANESTHESIA PREPROCEDURE EVALUATION
Anesthesia Pre-Procedure Evaluation    Patient: Adri Jaeger   MRN: [id number] : 1985        Preoperative Diagnosis: 39 weeks gestation of pregnancy [Z3A.39]   Procedure : Procedure(s):   SECTION     Past Medical History:   Diagnosis Date     Gestational diabetes     Taking insulin      No past surgical history on file.   Allergies   Allergen Reactions     Amoxicillin Rash      Social History     Tobacco Use     Smoking status: Not on file   Substance Use Topics     Alcohol use: Not on file      Wt Readings from Last 1 Encounters:   No data found for Wt        Anesthesia Evaluation   Pt has not had prior anesthetic         ROS/MED HX  ENT/Pulmonary:  - neg pulmonary ROS  (-) sleep apnea   Neurologic:       Cardiovascular:  - neg cardiovascular ROS     METS/Exercise Tolerance:     Hematologic:       Musculoskeletal:       GI/Hepatic:       Renal/Genitourinary:       Endo:     (+) gestational diabetes,    Psychiatric/Substance Use:       Infectious Disease:       Malignancy:       Other:            Physical Exam    Airway        Mallampati: III   TM distance: > 3 FB   Neck ROM: full     Respiratory Devices and Support         Dental           Cardiovascular          Rhythm and rate: regular and normal     Pulmonary           breath sounds clear to auscultation           OUTSIDE LABS:  CBC:   Lab Results   Component Value Date    WBC 14.3 (H) 2021    HGB 12.0 2021    HCT 36.2 2021     2021     BMP: No results found for: NA, POTASSIUM, CHLORIDE, CO2, BUN, CR, GLC  COAGS: No results found for: PTT, INR, FIBR  POC:   Lab Results   Component Value Date    BGM 96 2021     HEPATIC: No results found for: ALBUMIN, PROTTOTAL, ALT, AST, GGT, ALKPHOS, BILITOTAL, BILIDIRECT, LISA  OTHER: No results found for: PH, LACT, A1C, CARLITOS, PHOS, MAG, LIPASE, AMYLASE, TSH, T4, T3, CRP, SED    Anesthesia Plan     History & Physical Review      ASA Status:  3. emergent. NPO Status:   NPO Appropriate. .  Plan for Epidural         PONV prophylaxis:  Ondansetron (or other 5HT-3) and Dexamethasone or Solumedrol.       Consents  Anesthesia Plan(s) and associated risks, benefits, and realistic alternatives discussed.    Questions answered and patient/representative(s) expressed understanding.    Discussed with:  Patient.             Postoperative Care  Postoperative pain management: IV analgesics, Oral pain medications and Multi-modal analgesia.           Harvey Cat MD

## 2021-01-29 NOTE — PROVIDER NOTIFICATION
01/29/21 1312   Provider Notification   Provider Name/Title Dr. Villa   Method of Notification At Bedside     MD called to bedside for prolonged decel for 13 minutes. Heart tones as low as 60s. Heart tones returning to baseline upon physician's arrival to the bedside.    Intrauterine resuscitative measures included, IV ephedrine, position changes, fluid bolus, FSE placement. SVE 4.5/100/0, AROM occurred with FSE placement, moderate amount of clear fluid.

## 2021-01-29 NOTE — OP NOTE
[facility]  Obstetrics Operative Report    Surgery Date:  2021  Surgeon(s): Lauren Ann MD   Assistants:  none    Preoperative Diagnoses:  Intrauterine pregnancy at 39w0d  GDMA2  Category 2 tracing remote from delivery     Postoperative diagnoses: Same     Procedure performed:  primary low segment transverse  section     Anesthesia:  Epidural with duramorph  Est Blood Loss (mL): 579 mL  Fluid replacement: 1000 mL lactated Ringer's.   Urine output: 450 ml clear yellow urine   Specimens: cord segment and cord blood to cord blood banking (with patient), placenta (discarded, appeared very small and calcified)  Complications: None apparent    Operative findings:   1. Single viable male infant at 1421 hours on 2021. Apgars of 8 and 9 at one and five minutes.  Birth weight:: 7#2 oz.  Fetal presentation: cephalic. Amniotic fluid: clear. Placenta intact with 3 vessel cord.    2.Normal appearing uterus, fallopian tubes, ovaries.    3. No intraabdominal adhesions.  No abdominal wall adhesions      Indication: Adri Jaeger is a 35 year old, , who was admitted at 39w0d in spontaneous labor. She progressed to 4 cm and had an epidural and subsequently had fetal bradycardia for 8 minutes with continued late decelerations for 4 minutes. She had another 3 minute deceleration that resolved with position changes. Continued to remain category 2 tracing with recurrent deepening variables and minimal variability. Recommendation was to proceed with  delivery.  The risks, benefits, and alternatives of  delivery were explained and the patient agreed to proceed.     Procedure details:  After obtaining informed consent the patient was taken to the operating room. A safety patient time out was performed prior to the procedure. SCD's were placed. The patient received bolus of her epidural anesthesia with duramoprh prior to the skin incision. A reyna was placed. She was placed in  the dorsal supine position with a leftward tilt and prepped and draped in the usual sterile fashion. Nursing and [facility] staff were present and available for baby.     Following test of adequate anesthesia, the abdomen was entered through a Pfannenstiel skin incision. The skin incision was made sharply and carried through the subcutaneous tissue to the fascia.  Fascia was incised in the midline and extended laterally with the Larson scissors.  The superior margin of the fascial incision was grasped with Kocher clamps and dissected from the underlying muscle sharp and blunt dissecton, which was then repeated at the lower margin of the fascial incision.  The muscle was  in the midline.  The peritoneum was entered bluntly and the opening extended by digital dissection with care to avoid the bladder. German - O retractor was placed.  The vesicouterine peritoneum was entered sharply with Metzenbaum scissors and incision extended laterally. The bladder flap was created digitally and the bladder blade replaced. The lower segment of the uterus was opened sharply in a transverse fashion and extended with digital pressure. The infant's head was elevated to the level of the hysterotomy and was delivered atraumatically. The infant's body followed thereafter with fundal pressure. There was not a nuchal cord. Oxytocin was given through the running IV. The cord was doubly clamped after a short period of delayed cord clamping of 30 seconds and cut and the infant was handed off to the waiting nursing staff.       At this point, cord blood was collected in a sterile fashion for cord blood kit and was passed off to waiting RN. Attempt at cord blood was collected in a sterile cup. The placenta was delivering at this point and so was removed with gentle traction and appeared very small and calcified. The uterus was massaged and was noted to be firm.   With vigorous massage as well as administration of oxytocin, good uterine tone was  achieved. The hysterotomy was repaired with 0-vicryl suture in a running locked fashion. A 2nd layer of 0-vicryl in a running, non-locked fashion was used to imbricate the incision and good hemostasis was achieved. The bladder flap was inspected and found to be hemostatic. Small areas of the serosa were non hemostatic, this was made hemostatic with bovie.  The bilateral pericolic gutters were cleared of all clots.  The hysterotomy was again inspected and found to have no active sites of bleeding.  The abdominal wall was examined and found to have no active sites of bleeding.  Rectus on the right and left at the lower portion was made hemostatic with the bovie.     The fascia was closed with a running suture of 0-vicryl.  Subcutaneous tissue was irrigated. Areas that were oozing were controlled with cautery. The skin was closed with 4-0 monocryl and exofin skin glue.    The patient tolerated the procedure well and was taken to the recovery room in stable condition. All sponge, needle and instrument counts were correct x2.     MD [facility]  1/29/2021 3:19 PM

## 2021-01-29 NOTE — BRIEF OP NOTE
[facility]    Brief Operative Note    Pre-operative diagnosis:   39 weeks gestation of pregnancy [Z3A.39]  Category 2 tracing remote from delivery  GDMA2 on insulin     Post-operative diagnosis Same as pre-operative diagnosis    Procedure: Procedure(s):   SECTION  Surgeon: Surgeon(s) and Role:     * Lauren Ann MD - Primary  Anesthesia: Epidural   Estimated blood loss: 579 ml  Drains:  reyna cath  Specimens:   ID Type Source Tests Collected by Time Destination   1 :  Cord blood Blood OR DOCUMENTATION ONLY Lauren Ann MD 2021  2:21 PM      Findings:   see op note.  Complications: None.  Implants: * No implants in log *    IVF 1L   ml clear yellow urine    Lauren Ann MD  3:18 PM   2021

## 2021-01-29 NOTE — PROGRESS NOTES
Progress note    No improvement in fetal tracing since prolonged decelerations. Has had recurrent deep variables and late decels with minimal variability.     Discussed need to proceed with c/s. Patient already counseled on risks/benefits of c/s with Dr. Villa.     Hemoglobin   Date Value Ref Range Status   01/29/2021 12.0 11.7 - 15.7 g/dL Final           Lauren Ann MD  1/29/2021  2:07 PM

## 2021-01-29 NOTE — PROGRESS NOTES
Brief Progress Note    Called to bedside for fetal bradycardia.  Patient arrived for labor evaluation, admitted for labor.  Received an epidural - shortly after epidural had fetal bradycardia. Baseline 125 bpm, moderate variability. Then declined to aime of 60 bpm for 8 min with short recovery and continued late decelerations for another 4 minutes prior to recovery.  Once I arrived in the room  bpm, minimal variablity.  Per RN cervix 4 cm/90/0.  FSE applied.    Shortly after had another 3 minute deceleration that resolved with position changes.  FHR now 130s, minimal variability, no accels. Cervix no 5-6/90/0.  Will continue to monitor for now but did discuss in detail the possibility that she may need an emergent C/S.  Discussed risks including bleeding, infection, injury to adjacent organs or baby.  Informed consent was signed.  Also discussed that if persistent category II remote from delivery may recommend proceeding with C/S as well.  All questions answered.  Will sign out to my partner Dr. Ann for further cares.    MD [facility]

## 2021-01-29 NOTE — PROVIDER NOTIFICATION
21 1358   Provider Notification   Provider Name/Title Dr. Ann   Method of Notification In Department     Dr. Ann in department. Aware of FHT, will watch FHT for 10 more minutes before making official call for .

## 2021-01-29 NOTE — PROVIDER NOTIFICATION
21 1331   Provider Notification   Provider Name/Title Dr. Villa   Method of Notification At Bedside   Request Evaluate in Person   Notification Reason Decels     MD called to bedside for second prolonged deceleration. FHT returned to baseline with position changes. Consent signed for  if further decelerations arise. SVE 5/100/0.

## 2021-01-29 NOTE — H&P
OB Brief Admit H&P    No significant change in general health status based on examination of the patient, review of Nursing Admission Database and prenatal record.    Pt is a 35 year old  @ 39w0d who presented to L&D with painful contractions.    Patient's prenatal course has been complicated by   1.GDMA2 on insulin- sees endo    Prenatal Labs:    Blood type O+  Rubella immune   GCT failed prior to 20 weeks  GBS negative   S/p tdap and flu shot    EFW: 7-7.5#    /56   Temp 98  F (36.7  C) (Oral)   Resp 17   EFM:  130 + accels, no decels, mod variability   [place]: Q4-6min  SVE: 3/100%/-2 per RN   Membranes:  Bulging per RN.     Assessment:  35 year old  @ 39w0d admitted for spontaneous labor    Plan:  1. Admit to labor and delivery   2. GDMA2, orders placed for Q2 bs, insulin gtt prn  3. COVID testing ordered, negative  4. Admit labs  5. Epidural if desires     Lauren Ann MD  2021  2:02 PM

## 2021-01-29 NOTE — ANESTHESIA POSTPROCEDURE EVALUATION
Patient: Adri Jaeger    Procedure(s):   SECTION    Diagnosis:39 weeks gestation of pregnancy [Z3A.39]  Diagnosis Additional Information: No value filed.    Anesthesia Type:  No value filed.    Note:  Disposition: Inpatient   Postop Pain Control: Uneventful            Sign Out: Well controlled pain   PONV: No   Neuro/Psych: Uneventful            Sign Out: Acceptable/Baseline neuro status   Airway/Respiratory: Uneventful            Sign Out: Acceptable/Baseline resp. status   CV/Hemodynamics: Uneventful            Sign Out: Acceptable CV status   Other NRE: NONE   DID A NON-ROUTINE EVENT OCCUR? No         Last vitals:  Vitals:    21 1530 21 1545 21 1600   BP: 106/61 104/59 118/60   Resp:      Temp:      SpO2:   100%       Electronically Signed By: Harvey Cat MD  2021  4:18 PM

## 2021-01-29 NOTE — ANESTHESIA CARE TRANSFER NOTE
Patient: Adri Jaeger    Procedure(s):   SECTION    Diagnosis: 39 weeks gestation of pregnancy [Z3A.39]  Diagnosis Additional Information: No value filed.    Anesthesia Type:   No value filed.     Note:    Oropharynx: spontaneously breathing  Level of Consciousness: awake  Oxygen Supplementation: room air    Independent Airway: airway patency satisfactory and stable  Dentition: dentition unchanged  Vital Signs Stable: post-procedure vital signs reviewed and stable  Report to RN Given: handoff report given  Patient transferred to: Labor and Delivery  Comments: No anesthetic issues, VSS  Handoff Report: Identified the Reponsible Provider, Reviewed the pertinent medical history, Discussed the surgical course, Reviewed Intra-OP anesthesia mangement and issues during anesthesia, Allowed opportunity for questions and acknowledgement of understanding and Identifed the Patient      Vitals: (Last set prior to Anesthesia Care Transfer)  CRNA VITALS  2021 1438 - 2021 1518      2021             EKG:  Sinus rhythm        Electronically Signed By: JESE Richey CRNA  2021  3:18 PM

## 2021-01-29 NOTE — PROVIDER NOTIFICATION
01/29/21 1231   Provider Notification   Provider Name/Title Dr Cat   Method of Notification At Bedside       Epidural placement

## 2021-01-29 NOTE — PLAN OF CARE
Data: [facility]: 2021 11:38 AM.  Reason for maternal/fetal assessment is uterine contractions. Patient reports labor starting at 0630 this am and now feels much more intense, patient is visibly uncomfortable.  Patient is a .  Prenatal record reviewed. Pregnancy  has been complicated by gestational diabetes, insulin controlled.  Gestational Age 39w0d. VSS. Fetal movement present. Patient denies leaking of vaginal fluid/rupture of membranes, vaginal bleeding, nausea, vomiting, headache, visual disturbances, epigastric or URQ pain, significant edema. Support person is present.   Action: Verbal consent for EFM. Triage assessment completed. SVE 3100/-2 BBOW. IV initiated, covid swab done. Beginning prep for epidural. Bill of rights reviewed.  Response: Patient verbalized agreement with plan. Will contact Dr Lauren Ann with update and further orders.

## 2021-01-30 ENCOUNTER — ANESTHESIA EVENT (OUTPATIENT)
Dept: OBGYN | Facility: CLINIC | Age: 36
End: 2021-01-30

## 2021-01-30 ENCOUNTER — MEDICAL CORRESPONDENCE (OUTPATIENT)
Dept: HEALTH INFORMATION MANAGEMENT | Facility: CLINIC | Age: 36
End: 2021-01-30

## 2021-01-30 ENCOUNTER — ANESTHESIA (OUTPATIENT)
Dept: OBGYN | Facility: CLINIC | Age: 36
End: 2021-01-30

## 2021-01-30 PROBLEM — Z98.891 S/P CESAREAN SECTION: Status: ACTIVE | Noted: 2021-01-30

## 2021-01-30 LAB
GLUCOSE BLDC GLUCOMTR-MCNC: 73 MG/DL (ref 70–99)
HGB BLD-MCNC: 9.5 G/DL (ref 11.7–15.7)
T PALLIDUM AB SER QL: NONREACTIVE

## 2021-01-30 PROCEDURE — 999N000011 HC STATISTIC ANESTHESIA CASE

## 2021-01-30 PROCEDURE — 85018 HEMOGLOBIN: CPT | Performed by: OBSTETRICS & GYNECOLOGY

## 2021-01-30 PROCEDURE — 999N001017 HC STATISTIC GLUCOSE BY METER IP

## 2021-01-30 PROCEDURE — 999N000080 HC STATISTIC IP LACTATION SERVICES 16-30 MIN

## 2021-01-30 PROCEDURE — 370N000003 HC ANESTHESIA WARD SERVICE

## 2021-01-30 PROCEDURE — 36415 COLL VENOUS BLD VENIPUNCTURE: CPT | Performed by: OBSTETRICS & GYNECOLOGY

## 2021-01-30 PROCEDURE — 120N000001 HC R&B MED SURG/OB

## 2021-01-30 PROCEDURE — 250N000013 HC RX MED GY IP 250 OP 250 PS 637: Performed by: OBSTETRICS & GYNECOLOGY

## 2021-01-30 PROCEDURE — 250N000011 HC RX IP 250 OP 636: Performed by: OBSTETRICS & GYNECOLOGY

## 2021-01-30 RX ADMIN — ACETAMINOPHEN 975 MG: 325 TABLET, FILM COATED ORAL at 09:31

## 2021-01-30 RX ADMIN — IBUPROFEN 800 MG: 800 TABLET ORAL at 19:47

## 2021-01-30 RX ADMIN — DOCUSATE SODIUM 50 MG AND SENNOSIDES 8.6 MG 1 TABLET: 8.6; 5 TABLET, FILM COATED ORAL at 19:48

## 2021-01-30 RX ADMIN — DOCUSATE SODIUM 50 MG AND SENNOSIDES 8.6 MG 1 TABLET: 8.6; 5 TABLET, FILM COATED ORAL at 09:32

## 2021-01-30 RX ADMIN — KETOROLAC TROMETHAMINE 30 MG: 30 INJECTION, SOLUTION INTRAMUSCULAR at 07:04

## 2021-01-30 RX ADMIN — IBUPROFEN 800 MG: 800 TABLET ORAL at 13:55

## 2021-01-30 RX ADMIN — ACETAMINOPHEN 975 MG: 325 TABLET, FILM COATED ORAL at 16:03

## 2021-01-30 RX ADMIN — ACETAMINOPHEN 975 MG: 325 TABLET, FILM COATED ORAL at 03:08

## 2021-01-30 RX ADMIN — KETOROLAC TROMETHAMINE 30 MG: 30 INJECTION, SOLUTION INTRAMUSCULAR at 00:59

## 2021-01-30 RX ADMIN — ACETAMINOPHEN 975 MG: 325 TABLET, FILM COATED ORAL at 21:54

## 2021-01-30 NOTE — PLAN OF CARE
Patient meeting expected goals. Is up independent in room, meeting all personal and infant needs. VSS. Pain is being managed with Tylenol and Ibuprofen. Voiding with out difficulty. Incision to low abdomen is without drainage and no signs on infection noted. Breastfeeding assistance needed; is sleepy at breast.  Also formula feeding infant. Writer will assist with latch and is promoting breastfeeding.

## 2021-01-30 NOTE — ANESTHESIA POSTPROCEDURE EVALUATION
Patient: Adri Jaeger    Procedure(s):   SECTION    Diagnosis:39 weeks gestation of pregnancy [Z3A.39]  Diagnosis Additional Information: No value filed.    Anesthesia Type:  No value filed.    Note:  Disposition: Inpatient   Postop Pain Control:    PONV:    Neuro/Psych:    Airway/Respiratory:    CV/Hemodynamics:    Other NRE:    DID A NON-ROUTINE EVENT OCCUR?     Event details/Postop Comments:  Labor epidural analgesia satisfactory.  Epidursal catheter was used for Caesarean section delivery.  CRNA removed epidural catheter.  No residual sensorimotor blockade.  Patient denies headache, fever or chills.  She will notify postpartum RN of any problems or questions.         Last vitals:  Vitals:    21 0006 21 0400 21 0758   BP: 107/68 98/56 115/72   Pulse: 71 71 74   Resp: 18 17 14   Temp: 98.5  F (36.9  C) 98.3  F (36.8  C) 98.3  F (36.8  C)   SpO2: 100% 99%        Electronically Signed By: Deepak Barros MD  2021  10:56 AM

## 2021-01-30 NOTE — PROVIDER NOTIFICATION
01/30/21 0052   Provider Notification   Provider Name/Title Dr. Linares   Method of Notification Phone   Request Evaluate-Remote   Notification Reason Status Update   Updated MD that bedtime blood glucose was 121 - per protocol, to notify MD.  Dr. Linares verbalized understanding, no new orders at this time.  Will update primary RN.

## 2021-01-30 NOTE — PLAN OF CARE
Up ad unruly. Taking ibuprofen and tylenol for  discomfort. Assisted with breastfeeding and lactation seen pt/baby.

## 2021-01-30 NOTE — PROGRESS NOTES
OB CS post op note  POD# 1    S:  Patient doing well.  Pain controlled with PO meds.  [place] reg diet, Voiding, ambulating,  Bleeding min.  Breast feeding.    O:  /72   Pulse 74   Temp 98.3  F (36.8  C) (Oral)   Resp 14   SpO2 99%     Intake/Output Summary (Last 24 hours) at 1/30/2021 0907  Last data filed at 1/30/2021 0700  Gross per 24 hour   Intake 1965 ml   Output 3779 ml   Net -1814 ml      Gen- A&O, NAD  PULM: CTAB  CV: RRR  Abd- Non-tender, fundus firm at umbilicus  Incision: C/D/I with bandage in place  Ext- non-tender, no edema    Hemoglobin   Date Value Ref Range Status   01/30/2021 9.5 (L) 11.7 - 15.7 g/dL Final     Results for orders placed or performed during the hospital encounter of 01/29/21 (from the past 24 hour(s))   CBC with platelets differential   Result Value Ref Range    WBC 14.3 (H) 4.0 - 11.0 10e9/L    RBC Count 4.19 3.8 - 5.2 10e12/L    Hemoglobin 12.0 11.7 - 15.7 g/dL    Hematocrit 36.2 35.0 - 47.0 %    MCV 86 78 - 100 fl    MCH 28.6 26.5 - 33.0 pg    MCHC 33.1 31.5 - 36.5 g/dL    RDW 13.6 10.0 - 15.0 %    Platelet Count 203 150 - 450 10e9/L    Diff Method Automated Method     % Neutrophils 89.4 %    % Lymphocytes 7.8 %    % Monocytes 2.0 %    % Eosinophils 0.1 %    % Basophils 0.1 %    % Immature Granulocytes 0.6 %    Nucleated RBCs 0 0 /100    Absolute Neutrophil 12.8 (H) 1.6 - 8.3 10e9/L    Absolute Lymphocytes 1.1 0.8 - 5.3 10e9/L    Absolute Monocytes 0.3 0.0 - 1.3 10e9/L    Absolute Eosinophils 0.0 0.0 - 0.7 10e9/L    Absolute Basophils 0.0 0.0 - 0.2 10e9/L    Abs Immature Granulocytes 0.1 0 - 0.4 10e9/L    Absolute Nucleated RBC 0.0    ABO and Rh   Result Value Ref Range    ABO O     RH(D) Pos     Specimen Expires 02/01/2021    Asymptomatic SARS-CoV-2 COVID-19 Virus (Coronavirus) by PCR    Specimen: Nasopharyngeal   Result Value Ref Range    SARS-CoV-2 Virus Specimen Source Nasopharyngeal     SARS-CoV-2 PCR Result NEGATIVE     SARS-CoV-2 PCR Comment (Note)    Glucose by meter    Result Value Ref Range    Glucose 106 (H) 70 - 99 mg/dL   Glucose by meter   Result Value Ref Range    Glucose 96 70 - 99 mg/dL   Glucose by meter   Result Value Ref Range    Glucose 138 (H) 70 - 99 mg/dL   Glucose by meter   Result Value Ref Range    Glucose 121 (H) 70 - 99 mg/dL   Hemoglobin   Result Value Ref Range    Hemoglobin 9.5 (L) 11.7 - 15.7 g/dL   Glucose by meter   Result Value Ref Range    Glucose 73 70 - 99 mg/dL      O  Rubella Immune    A/P: 35 year old  POD# 1 s/p PLTCS for NRFHT (cat 2) remote from delivery    1.  Routine post-partum cares.   - Pain controlled   - Diet reg   - Ambulate    - IS as needed  2. GDMA2:  Monitor blood sugar while in pt.  Will do GCT at 6 wks.      2.  Anticipate d/c home on POD# 3 or 4.    Natanael Jones,   2021  9:07 AM

## 2021-01-30 NOTE — PLAN OF CARE
VSS. Pain well managed with ibuprofen and Toradol.  Incision to lower abdomen is well approximated; no drainage or signs/symptoms of infection noted.  Patient ambulating to restroom and voiding adequately without difficulty.   Patient is breastfeeding; encouraged to call out for assistance with latch as needed.  Patient and SO bonding well with infant encouraged to call out for assistance with cares and questions as needed; questions answered and education provided.        Patients mobililty level scored using the bedside mobility assistance tool (BMAT). Patient is at a mobility level test number: 4. Mobility equipment used: none required. Required assist of 0 staff members. Further use of BMAT scoring not required.

## 2021-01-30 NOTE — PLAN OF CARE
Data: Adri Jaeger transferred to [facility] via cartat 1900. Baby transferred via parent's arms.  Action: Receiving unit notified of transfer: Yes. Patient and family notified of room change. Report given to Nimisha ALBARADO RN at 1915. Belongings sent to receiving unit. Accompanied by Registered Nurse. Oriented patient to surroundings. Call light within reach. ID bands double-checked with receiving RN.  Response: Patient tolerated transfer and is stable.

## 2021-01-31 PROCEDURE — 250N000013 HC RX MED GY IP 250 OP 250 PS 637: Performed by: OBSTETRICS & GYNECOLOGY

## 2021-01-31 PROCEDURE — 120N000001 HC R&B MED SURG/OB

## 2021-01-31 PROCEDURE — 999N000080 HC STATISTIC IP LACTATION SERVICES 16-30 MIN

## 2021-01-31 RX ADMIN — ACETAMINOPHEN 975 MG: 325 TABLET, FILM COATED ORAL at 10:15

## 2021-01-31 RX ADMIN — ACETAMINOPHEN 975 MG: 325 TABLET, FILM COATED ORAL at 04:09

## 2021-01-31 RX ADMIN — IBUPROFEN 800 MG: 800 TABLET ORAL at 16:21

## 2021-01-31 RX ADMIN — ACETAMINOPHEN 975 MG: 325 TABLET, FILM COATED ORAL at 22:29

## 2021-01-31 RX ADMIN — IBUPROFEN 800 MG: 800 TABLET ORAL at 10:15

## 2021-01-31 RX ADMIN — IBUPROFEN 800 MG: 800 TABLET ORAL at 02:58

## 2021-01-31 RX ADMIN — IBUPROFEN 800 MG: 800 TABLET ORAL at 22:29

## 2021-01-31 RX ADMIN — ACETAMINOPHEN 975 MG: 325 TABLET, FILM COATED ORAL at 16:21

## 2021-01-31 RX ADMIN — DOCUSATE SODIUM 50 MG AND SENNOSIDES 8.6 MG 1 TABLET: 8.6; 5 TABLET, FILM COATED ORAL at 22:29

## 2021-01-31 RX ADMIN — DOCUSATE SODIUM 50 MG AND SENNOSIDES 8.6 MG 1 TABLET: 8.6; 5 TABLET, FILM COATED ORAL at 10:16

## 2021-01-31 NOTE — PROGRESS NOTES
OB Post-op  Section Progress Note POD# 2    S:  Patient doing well.  Pain well controlled with oral pain medication.  Ambulating.  Tolerating reg diet.  No N/V.  Passing flatus.  Voiding.  Bleeding is normal.  Breastfeeding.    O:  /61   Pulse 68   Temp 98.4  F (36.9  C) (Oral)   Resp 18   SpO2 99%     Gen- A&O, NAD  Abd- soft, non tender, non distended  Fundus- firm, non tender  Incision/Dressing- C/D/I  Ext- non-tender, 1+ edema.     Hemoglobin   Date Value Ref Range Status   2021 9.5 (L) 11.7 - 15.7 g/dL Final     O +  Rubella immune    A/P:  35 year old  POD# 2 s/p primary LTCS for NRFHT    1.  Routine post-op cares  2.  Acute blood loss anemia: asymptomatic, iron on discharge   3.  A2GDM: fasting BG 73, continue QID BG's  4.  Discharge POD#3    Shavon Linares MD  2021  8:58 AM

## 2021-01-31 NOTE — PLAN OF CARE
Patient meeting expected goals. Is up independent in room, meeting all personal and infant needs. VSS.  Pain is being managed with Tylenol and Ibuprofen. Incision to low abdomen is well approximated, with liquid bandage, no drainage or signs of infection noted.  Breastfeeding is going well.  Is more independent and feeling more confident with BF. Also giving infant formula if still seems hungry.  Patient is stable and will be ready for discharge tomorrow. Will need breast pump.  Wants prescriptions filled here.

## 2021-01-31 NOTE — LACTATION NOTE
Lactation in to see patient. Baby breastfeeding. Sleepy at breast with feed. Writer able to wake baby up and he latched and nursed well with breast compressions. Patient fed football on left and cross body on right. Left breast milk reddish brown in color, right clear yellow. Educated on this being ok. Bedside nurse in with subsequent feeds, states baby improved. Basic breastfeeding information given. Encouraged to call for assistance.

## 2021-02-01 VITALS
TEMPERATURE: 98.2 F | DIASTOLIC BLOOD PRESSURE: 88 MMHG | HEART RATE: 87 BPM | RESPIRATION RATE: 18 BRPM | OXYGEN SATURATION: 99 % | SYSTOLIC BLOOD PRESSURE: 131 MMHG

## 2021-02-01 PROCEDURE — 250N000013 HC RX MED GY IP 250 OP 250 PS 637: Performed by: OBSTETRICS & GYNECOLOGY

## 2021-02-01 RX ORDER — AMOXICILLIN 250 MG
1 CAPSULE ORAL 2 TIMES DAILY
Qty: 30 TABLET | Refills: 0 | Status: SHIPPED | OUTPATIENT
Start: 2021-02-01

## 2021-02-01 RX ORDER — OXYCODONE HYDROCHLORIDE 5 MG/1
5 TABLET ORAL EVERY 4 HOURS PRN
Qty: 15 TABLET | Refills: 0 | Status: SHIPPED | OUTPATIENT
Start: 2021-02-01

## 2021-02-01 RX ORDER — IBUPROFEN 800 MG/1
800 TABLET, FILM COATED ORAL EVERY 8 HOURS PRN
Qty: 60 TABLET | Refills: 0 | Status: SHIPPED | OUTPATIENT
Start: 2021-02-01

## 2021-02-01 RX ADMIN — ACETAMINOPHEN 975 MG: 325 TABLET, FILM COATED ORAL at 10:49

## 2021-02-01 RX ADMIN — IBUPROFEN 800 MG: 800 TABLET ORAL at 10:49

## 2021-02-01 RX ADMIN — ACETAMINOPHEN 975 MG: 325 TABLET, FILM COATED ORAL at 04:47

## 2021-02-01 RX ADMIN — IBUPROFEN 800 MG: 800 TABLET ORAL at 04:47

## 2021-02-01 RX ADMIN — DOCUSATE SODIUM 50 MG AND SENNOSIDES 8.6 MG 1 TABLET: 8.6; 5 TABLET, FILM COATED ORAL at 10:49

## 2021-02-01 NOTE — PROGRESS NOTES
[facility]   Obstetrics Post-Op / POD # 3         Interval History:   Doing well.  Pain is controlled.   Good appetite.  Denies chest pain, shortness of breath, nausea or vomiting.  Breastfeeding well.  Urinating and passing flatus. Baby being checked for jaundice.           Significant Problems:      Patient Active Problem List   Diagnosis     Uterine contractions during pregnancy     S/P  section             Review of Systems:    The patient denies any chest pain, shortness of breath, excessive pain, fever, chills, purulent drainage from the wound, nausea or vomiting.          Medications:   All medications related to the patient's surgery have been reviewed          Physical Exam:     Patient Vitals for the past 24 hrs:   BP Temp Temp src Pulse Resp   21 0015 129/72 98.6  F (37  C) Oral 75 16   21 1621 115/68 98  F (36.7  C) Oral 86 18     No intake or output data in the 24 hours ending 21 1046    All vitals stable  Uterus firm and nontender  Wound clean and dry with minimal or no drainage.  Surrounding skin with minimal erythema.  Musculoskeletal:  there is no redness, warmth, or swelling of the lower extremities          Data:   All laboratory data related to this surgery reviewed  Lab Results   Component Value Date    HGB 9.5 (L) 2021    HGB 12.0 2021            Assessment and Plan:    Assessment:     Post-operative day #3  Low transverse primary  section  GDM2  Doing well.  Clean wound without signs of infection.  No immediate surgical complications identified.  Pain well-controlled.      Plan:  Oxycodone 5 mg # 15 PRN  Ibuprofen 800 mg #60  Stool softener  Discharge later today  F/u in 2 weeks and 6 weeks.   Instructions given.       Cherrie Kilgore MD  2021  10:46 AM

## 2021-02-01 NOTE — LACTATION NOTE
Writer following up from yesterdays visit. Parents stated, baby had a period of cluster feeding. Discussed normal for second day and benefits of it. Encouraged parents to continue to listen for swallows. Parents had decided to continue to supplement with formula if baby has aother period of fussiness after good breastfeeding sessions. Support given. Encouraged to call for questions or concerns.

## 2021-02-01 NOTE — PLAN OF CARE
Patient meeting expected goals. Is up independent in room, meeting all personal and infant needs. VSS. Pain is being managed with Tylenol, Ibuprofen . Voiding with out difficulty. Incision to low abdomen is without drainage and no signs on infection noted. Breastfeeding is going well and bonding well with infant. All discharge instructions were reviewed with patient by writer and all questions answered.  Patient is aware to follow up in 2 weeks and at 6 weeks for PP visit. Breast pump and discharge medications reviewed and next times available discussed.  Patient will leave unit soon.

## 2021-02-01 NOTE — PLAN OF CARE
Assumed care [id number]. Bonding well with baby. Ambulating independently, voiding without difficulty. Denies headache, dizziness, SOB, chest pain, and changes in vision. Ibuprofen and tylenol for pain management. VSS.

## 2021-02-01 NOTE — DISCHARGE INSTRUCTIONS
Please follow-up with your OBGYN in 2 and 6 weeks      Postop  Birth Instructions    Activity       Do not lift more than 10 pounds for 6 weeks after surgery.  Ask family and friends for help when you need it.    No driving until you have stopped taking your pain medications (usually two weeks after surgery).    No heavy exercise or activity for 6 weeks.  Don't do anything that will put a strain on your surgery site.    Don't strain when using the toilet.  Your care team may prescribe a stool softener if you have problems with your bowel movements.     To care for your incision:       Keep the incision clean and dry.    Do not soak your incision in water. No swimming or hot tubs until it has fully healed. You may soak in the bathtub if the water level is below your incision.    Do not use peroxide, gel, cream, lotion, or ointment on your incision.    Adjust your clothes to avoid pressure on your surgery site (check the elastic in your underwear for example).     You may see a small amount of clear or pink drainage and this is normal.  Check with your health care provider:       If the drainage increases or has an odor.    If the incision reddens, you have swelling, or develop a rash.    If you have increased pain and the medicine we prescribed doesn't help.    If you have a fever above 100.4 F (38 C) with or without chills when placing thermometer under your tongue.   The area around your incision (surgery wound), will feel numb.  This is normal. The numbness should go away in less than a year.     Keep your hands clean:  Always wash your hands before touching your incision (surgery wound). This helps reduce your risk of infection. If your hands aren't dirty, you may use an alcohol hand-rub to clean your hands. Keep your nails clean and short.    Call your healthcare provider if you have any of these symptoms:       You soak a sanitary pad with blood within 1 hour, or you see blood clots larger than a golf  ball.    Bleeding that lasts more than 6 weeks.    Vaginal discharge that smells bad.    Severe pain, cramping or tenderness in your lower belly area.    A need to urinate more frequently (use the toilet more often), more urgently (use the toilet very quickly), or it burns when you urinate.    Nausea and vomiting.    Redness, swelling or pain around a vein in your leg.    Problems breastfeeding or a red or painful area on your breast.    Chest pain and cough or are gasping for air.    Problems with coping with sadness, anxiety or depression. If you have concerns about hurting yourself or the baby, call your provider immediately.      You have questions or concerns after you return home.

## 2021-02-02 NOTE — ANESTHESIA PROCEDURE NOTES
Pre-Procedure   Staff -   Performed By: Anesthesiologist  Comments:  Pre-Procedure  Performed by Harvey Cat MD  Location: OB.      PreAnesthestic Checklist: patient identified, IV checked, risks and benefits discussed, informed consent obtained, monitors and equipment checked, pre-op evaluation and at physician/surgeon's request.    Timeout   Correct Patient: Yes  Correct Procedure: Epidural catheter placement  Correct Site: Yes   Correct Position: Yes    Procedure Documentation  Procedure:   Epidural catheter block for Labor    Patient currently in labor and she and OBMD request a labor epidural to control her labor pains. Patient was interviewed and examined. Procedure and risks including but not limited to bleeding, infection, nerve injury, paralysis, PDPH, and inadequate block requiring intervention discussed with patient. Questions answered. This epidural is to be placed in anticipation of vaginal delivery.  She consents to the epidural procedure.  Time-out was performed.  I or my partners remain immediately available for management of any issues or complications and will monitor at appropriate intervals.  Procedure: Patient sitting. Betadine prep x 3. Sterile drape applied.  Mask and gloves used.  Lidocaine 1%  local infiltration at L 3-4.  17 G. Tuohy needle at L3-4 by loss of resistance into epidural space.  No CSF, paresthesia or blood. 1.5 % Lidocaine with 1:200,000 Epinephrine 5cc test dose. Epidural catheter inserted w/o resistance to 5 cm in epidural space. Then 0.125% bupivicaine with fentanyl 2 mcg/ml 12 cc with NS 5 cc.  Aspiration negative for blood and CSF.   Negative for neuro change, paresthesia or symptoms of intravascular injection or intrathecal injection.  Infusion orders written and infusion of 0.125% bupivicaine with fentanyl 2 mcg/ml at 10cc per hour started.    Harvey Cat MD

## 2021-02-04 NOTE — DISCHARGE SUMMARY
[facility] Discharge Summary    Adri Jaeger MRN# [id number]   Age: 35 year old YOB: 1985     Date of Admission:  2021  Date of Discharge:  2021    Admitting Physician:  Lauren Ann MD  Discharge Physician:  Cherrie Kilgore MD     Admit Dx:   - Intrauterine pregnancy at 39w0d   - spontaneous labor   - gDMA2    Discharge Dx:  - Same as above, s/p primary low transverse  section  - acute surgical blood loss anemia.     Procedures:  - primary low transverse  section with 2 layer closure via Pfannenstiel incision    Admit HPI:  Ms. Adri Jaeger is a 35 year old  at 39w0d who was admitted on 2021 in spontaneous labor.  Please see her admit H&P for full details of her PMH, PSH, Meds, Allergies and exam on admit.    Hospital course:  Fetus had persistent category 2 tracing with a long episode of bradycardia and then recurrent decelerations after epidural.  Discussed recommendation to proceed with . After discussion of risk and and benefits and signing informed consent the patient was taken to the operating room for primary  section.    EBL from the delivery was 579 ml. Please see her  Section Operative Note for full details regarding her delivery.    Her postoperative course was uncomplicated . On POD#3, she was meeting all of her postpartum goals and deemed stable for discharge. She was voiding without difficulty, tolerating a regular diet without nausea and vomiting, her pain was well controlled on oral pain medicines and her lochia was appropriate. Her hemoglobin prior to delivery was 12 and after delivery was 9.5. Her Rh status was positive and Rhogam was not indicated.      Discharge Medications:  Discharge Medication List as of 2021 10:48 AM      CONTINUE these medications which have NOT CHANGED    Details   blood glucose monitoring (MARICEL MICROLET) lancets Use to test blood sugar 4 times daily or as directed.   Ok to substitute alternative if insurance prefers., Disp-150 each,R-11, E-Prescribe      Blood Glucose Monitoring Suppl (CONTOUR NEXT ONE) KIT 1 Device daily Ok to substitute alternative if insurance prefers., Disp-1 kit,R-1, Sample               Discharge/Disposition:  Adri Jaeger was discharged to home in stable condition with the following instructions/medications:  1) Call for temperature > 100.4, bright red vaginal bleeding >1 pad an hour x 2 hours, foul smelling vaginal discharge, pain not controlled by usual oral pain meds, persistent nausea and vomiting not controlled on medications, drainage or redness from incision site  2) For feeding she decided to breastfeed.  3) She was instructed to follow-up with her primary OB in 6 weeks for a routine postpartum visit and 2 weeks for an incision check. She will need a 2 hr gtt at 6 weeks post partum. She may stop checking her blood sugars.   4) Discharge activity:  No heavy lifting >15 lbs or strenuous activity for 6 weeks, pelvic rest for 6 weeks, no driving or operating machinery while on narcotics.      MD Bert Mendez OB/GYN  2/3/2021 6:48 PM

## 2021-03-07 ENCOUNTER — HEALTH MAINTENANCE LETTER (OUTPATIENT)
Age: 36
End: 2021-03-07

## 2021-10-11 ENCOUNTER — HEALTH MAINTENANCE LETTER (OUTPATIENT)
Age: 36
End: 2021-10-11

## 2022-03-27 ENCOUNTER — HEALTH MAINTENANCE LETTER (OUTPATIENT)
Age: 37
End: 2022-03-27

## 2022-09-24 ENCOUNTER — HEALTH MAINTENANCE LETTER (OUTPATIENT)
Age: 37
End: 2022-09-24

## 2023-05-08 ENCOUNTER — HEALTH MAINTENANCE LETTER (OUTPATIENT)
Age: 38
End: 2023-05-08

## 2023-12-19 ENCOUNTER — LAB REQUISITION (OUTPATIENT)
Dept: LAB | Facility: CLINIC | Age: 38
End: 2023-12-19
Payer: COMMERCIAL

## 2023-12-19 DIAGNOSIS — Z01.419 ENCOUNTER FOR GYNECOLOGICAL EXAMINATION (GENERAL) (ROUTINE) WITHOUT ABNORMAL FINDINGS: ICD-10-CM

## 2023-12-19 DIAGNOSIS — N92.6 IRREGULAR MENSTRUATION, UNSPECIFIED: ICD-10-CM

## 2023-12-19 LAB
HBA1C MFR BLD: 6.7 %
HOLD SPECIMEN: NORMAL

## 2023-12-19 PROCEDURE — 87624 HPV HI-RISK TYP POOLED RSLT: CPT | Mod: ORL | Performed by: NURSE PRACTITIONER

## 2023-12-19 PROCEDURE — 84443 ASSAY THYROID STIM HORMONE: CPT | Mod: ORL | Performed by: NURSE PRACTITIONER

## 2023-12-19 PROCEDURE — G0145 SCR C/V CYTO,THINLAYER,RESCR: HCPCS | Mod: ORL | Performed by: NURSE PRACTITIONER

## 2023-12-19 PROCEDURE — 84146 ASSAY OF PROLACTIN: CPT | Mod: ORL | Performed by: NURSE PRACTITIONER

## 2023-12-19 PROCEDURE — 83001 ASSAY OF GONADOTROPIN (FSH): CPT | Mod: ORL | Performed by: NURSE PRACTITIONER

## 2023-12-19 PROCEDURE — 84439 ASSAY OF FREE THYROXINE: CPT | Mod: ORL | Performed by: NURSE PRACTITIONER

## 2023-12-19 PROCEDURE — 83036 HEMOGLOBIN GLYCOSYLATED A1C: CPT | Mod: ORL | Performed by: NURSE PRACTITIONER

## 2023-12-20 LAB
FSH SERPL IRP2-ACNC: 3.5 MIU/ML
PROLACTIN SERPL 3RD IS-MCNC: 9 NG/ML (ref 5–23)
T4 FREE SERPL-MCNC: 1.29 NG/DL (ref 0.9–1.7)
TSH SERPL DL<=0.005 MIU/L-ACNC: 3.74 UIU/ML (ref 0.3–4.2)

## 2023-12-22 LAB
BKR LAB AP GYN ADEQUACY: NORMAL
BKR LAB AP GYN INTERPRETATION: NORMAL
BKR LAB AP HPV REFLEX: NORMAL
BKR LAB AP LMP: NORMAL
BKR LAB AP PREVIOUS ABNL DX: NORMAL
PATH REPORT.COMMENTS IMP SPEC: NORMAL
PATH REPORT.COMMENTS IMP SPEC: NORMAL
PATH REPORT.RELEVANT HX SPEC: NORMAL

## 2023-12-26 LAB
HUMAN PAPILLOMA VIRUS 16 DNA: NEGATIVE
HUMAN PAPILLOMA VIRUS 18 DNA: NEGATIVE
HUMAN PAPILLOMA VIRUS FINAL DIAGNOSIS: NORMAL
HUMAN PAPILLOMA VIRUS OTHER HR: NEGATIVE

## 2024-01-31 NOTE — PROGRESS NOTES
Diabetes Self-Management Education & Support    SUBJECTIVE/OBJECTIVE:  Telephone appointment for education related to gestational diabetes.    Patient verbally consented to the telephone visit service today: yes    Cultural Influences/Ethnic Background:  [place]    There were no vitals taken for this visit.    Weight gain Unknown    Estimated Date of Delivery: 2/8/2021  Blood Glucose/Ketone Log:   Blood Glucose/Ketone Log:    Date Ketones Fasting Post Breakfast Post Lunch Post Supper   9/1  97 100     8/31  123 125 146 147   8/30  101 105 182 145   8/29  114 115 177 169   8/28  108 138 149 148   8/27  124 91 203 202     Breakfast: almonds and walnuts and 1 glass of milk  Walk 15 minutes  Snack: If she feels hungry  Lunch: Rice with vegetables and lentils OR whole wheat flat bread and lentils and vegetables  Walk 15 minutes  Snack: If she feels hungry  Dinner: whole wheat flat bread with vegetables and lentils  Walk 15 minutes  Pm snack: milk      Lifestyle and Health Behaviors:       Healthy Coping:       Current Management:       ASSESSMENT:  Ketones: not testing.   Fasting blood glucoses: 0% in target.  After breakfast: 100% in target.  After lunch: 0% in target.  After dinner: 0% in target.    Only Adri's after breakfast readings are in target, all other numbers elevated. She has been experimenting with different foods and different amounts of foods at her meal to help keep her blood sugars down. She states that the carbohydrate counting is going well, no questions or concerns.  She has found that when she doesn't have a bedtime snack (last night) her fasting BG was closer to normal.    Writer discussed recommendation to start insulin, patient is very hesitant and is wondering if she can work on her diet for one more week.  Writer explained that Bert GRIER will refer her to Endocrinology which can take a couple of days, recommended that she schedule the Endo appt and work on diet changes until the  appointment. Explained that the insulin needs increase as the pregnancy progresses and it will get harder and harder to bring BG down with diet alone. Also explained that we would like to get the blood sugars down quickly to help protect the baby. Patient is reluctant, but agreeable to plan.     INTERVENTION:  Educational topics covered today:  Discussed meal plan, insulin start recommendations, discussed exercise.    Educational Materials provided today:  No new materials    PLAN:  Recommend insulin start- sending to [facility] for endocrinology referral. Endo will follow for remainder of pregnancy.  Encouraged patient to call with any questions/concerns until endocrinology appointment.     Check glucose 4 times daily.  Check ketones once a week when readings are consistently negative.  Continue with recommended physical activity.  Continue to follow recommended meal plan: 2-3 carbs at breakfast, 3-4 carbs at lunch, 3-4 carbs at supper, 1-2 carbs at snacks.  Follow consistent CHO meal plan, eat CHO and protein/fat at all meals/snacks.    Call/e-mail/MyChart message diabetes educator if 3 or more blood sugars are above the goal in 1 week or if ketones are positive.    Nena Ch, RD, LD, CDE  Time Spent: 26 minutes  Encounter Type: Individual    Any diabetes medication dose changes were made via the CDE Protocol and Collaborative Practice Agreement with the patient's referring provider. A copy of this encounter was shared with the provider.     No

## 2024-03-22 PROCEDURE — 88305 TISSUE EXAM BY PATHOLOGIST: CPT | Mod: TC,ORL | Performed by: OBSTETRICS & GYNECOLOGY

## 2024-03-22 PROCEDURE — 88305 TISSUE EXAM BY PATHOLOGIST: CPT | Mod: 26 | Performed by: PATHOLOGY

## 2024-03-25 ENCOUNTER — LAB REQUISITION (OUTPATIENT)
Dept: LAB | Facility: CLINIC | Age: 39
End: 2024-03-25
Payer: COMMERCIAL

## 2024-03-25 DIAGNOSIS — N84.8 POLYP OF OTHER PARTS OF FEMALE GENITAL TRACT: ICD-10-CM

## 2024-03-26 LAB
PATH REPORT.COMMENTS IMP SPEC: NORMAL
PATH REPORT.COMMENTS IMP SPEC: NORMAL
PATH REPORT.FINAL DX SPEC: NORMAL
PATH REPORT.GROSS SPEC: NORMAL
PATH REPORT.MICROSCOPIC SPEC OTHER STN: NORMAL
PATH REPORT.RELEVANT HX SPEC: NORMAL
PHOTO IMAGE: NORMAL

## 2024-07-14 ENCOUNTER — HEALTH MAINTENANCE LETTER (OUTPATIENT)
Age: 39
End: 2024-07-14

## (undated) DEVICE — SOL NACL 0.9% IRRIG 1000ML BOTTLE 2F7124

## (undated) DEVICE — BAG CLEAR TRASH 1.3M 39X33" P4040C

## (undated) DEVICE — PACK C-SECTION LF PL15OTA83B

## (undated) DEVICE — Device

## (undated) DEVICE — PREP CHLORAPREP 26ML TINTED HI-LITE ORANGE 930815

## (undated) DEVICE — TRANSFER DEVICE BLOOD NDL HOLDER 364880

## (undated) DEVICE — STOCKING SLEEVE VASOPRESS COMPRESSION CALF MED 18" VP501M

## (undated) DEVICE — CATH TRAY FOLEY SURESTEP 16FR DRAIN BAG STATOCK A899916

## (undated) DEVICE — SUCTION CANISTER MEDIVAC LINER 3000ML W/LID 65651-530

## (undated) DEVICE — SOL WATER IRRIG 1000ML BOTTLE 2F7114

## (undated) DEVICE — SOLIDIFIER (USE FOR UP TO 1500CC) MSOLID1500

## (undated) DEVICE — ESU GROUND PAD ADULT W/CORD E7507

## (undated) DEVICE — LINEN TOWEL PACK X10 5473

## (undated) DEVICE — ADH SKIN CLOSURE PREMIERPRO EXOFIN 1.0ML 3470

## (undated) DEVICE — BAG YELLOW TRASH 30.5X43" G4300Y

## (undated) DEVICE — LINEN HALF SHEET 5512

## (undated) DEVICE — LINEN DRAPE 54X72" 5467

## (undated) DEVICE — LINEN BABY BLANKET 5434

## (undated) DEVICE — DRSG NEW SKIN 1OZ LIQUID

## (undated) DEVICE — ESU PENCIL W/SMOKE EVAC CVPLP2000

## (undated) DEVICE — LINEN FULL SHEET 5511

## (undated) RX ORDER — FENTANYL CITRATE-0.9 % NACL/PF 10 MCG/ML
PLASTIC BAG, INJECTION (ML) INTRAVENOUS
Status: DISPENSED
Start: 2021-01-29

## (undated) RX ORDER — ONDANSETRON 2 MG/ML
INJECTION INTRAMUSCULAR; INTRAVENOUS
Status: DISPENSED
Start: 2021-01-29

## (undated) RX ORDER — EPHEDRINE SULFATE 50 MG/ML
INJECTION, SOLUTION INTRAMUSCULAR; INTRAVENOUS; SUBCUTANEOUS
Status: DISPENSED
Start: 2021-01-29

## (undated) RX ORDER — MORPHINE SULFATE 1 MG/ML
INJECTION, SOLUTION EPIDURAL; INTRATHECAL; INTRAVENOUS
Status: DISPENSED
Start: 2021-01-29